# Patient Record
Sex: FEMALE | Race: ASIAN | NOT HISPANIC OR LATINO | Employment: STUDENT | URBAN - METROPOLITAN AREA
[De-identification: names, ages, dates, MRNs, and addresses within clinical notes are randomized per-mention and may not be internally consistent; named-entity substitution may affect disease eponyms.]

---

## 2021-11-08 ENCOUNTER — HOSPITAL ENCOUNTER (EMERGENCY)
Facility: CLINIC | Age: 26
Discharge: HOME | End: 2021-11-08
Attending: EMERGENCY MEDICINE | Admitting: EMERGENCY MEDICINE
Payer: COMMERCIAL

## 2021-11-08 VITALS
BODY MASS INDEX: 40.57 KG/M2 | RESPIRATION RATE: 18 BRPM | WEIGHT: 229 LBS | SYSTOLIC BLOOD PRESSURE: 133 MMHG | OXYGEN SATURATION: 99 % | TEMPERATURE: 98.1 F | HEART RATE: 81 BPM | DIASTOLIC BLOOD PRESSURE: 70 MMHG

## 2021-11-08 DIAGNOSIS — F12.90 MARIJUANA USE: ICD-10-CM

## 2021-11-08 PROCEDURE — 99282 EMERGENCY DEPT VISIT SF MDM: CPT

## 2021-11-08 ASSESSMENT — ENCOUNTER SYMPTOMS: TREMORS: 1

## 2021-11-08 NOTE — ED PROVIDER NOTES
EMERGENCY DEPARTMENT ENCOUNTER      NAME: Tiffanie Matos  AGE: 26 year old female  YOB: 1995  MRN: [id number]  EVALUATION DATE & TIME: 11/8/2021  2:26 AM    PCP: Jennifer Castro    ED PROVIDER: Omaira Lozano M.D.      Chief Complaint   Patient presents with     Panic Attack         FINAL IMPRESSION:  1. Marijuana use        MEDICAL DECISION MAKING:  Pertinent Labs & Imaging studies reviewed. (See chart for details)  ED Course as of 11/08/21 0506   Mon Nov 08, 2021   0320 Patient is also vital signs here with some mild tachypnea, hypertension the patient with significant anxiety at that point.  Repeat vital signs here now within normal limits.  Patient states that she is experiencing symptoms unusual after ingesting marijuana gummy.  She states she is done this in the past, she states that she is worried that she may have overdosed on marijuana.  She states she now feels as if she is coming down when she is in the room.  Denies any thoughts of self-harm.  There is initially some concern in triage that she had some suicidal ideation however when questioned about this multiple times she is not having any thoughts of self-harm, no suicidal ideation at this time.  Occasionally feels depressed but is not suicidal or homicidal.  Denies any concomitant drug use.Physical exam for patient here lying in bed without acute cardiopulmonary distress.  Normal physical exam.We will just monitor here in the emergency department for small amount of time.  No need for urgent imaging at this time.  Family is here at bedside.       Watch patient for 2 hours here, she now states he feels better.  Again not endorsing any thoughts of self-harm.  Her mother is here at bedside.  Will discharge home at this time.    Critical care: 0 minutes excluding separately billable procedures.  Includes bedside management, time reviewing test results, review of records, discussing the case with staff, documenting the medical record and time  "spent with family members (or surrogate decision makers) discussing specific treatment issues.      ED Course:  2:57 AM I met with the patient, obtained history, performed an initial exam, and discussed options and plan for diagnostics and treatment here in the ED.     The importance of close follow up was discussed. We reviewed warning signs and symptoms, and I instructed Ms. Matos to return to the emergency department immediately if she develops any new or worsening symptoms. I provided additional verbal discharge instructions. Ms. Matos expressed understanding and agreement with this plan of care, her questions were answered, and she was discharged in stable condition.     MEDICATIONS GIVEN IN THE EMERGENCY:  Medications - No data to display    NEW PRESCRIPTIONS STARTED AT TODAY'S ER VISIT:  Discharge Medication List as of 11/8/2021  4:23 AM             =================================================================    HPI    Patient information was obtained from: Patient    Use of : N/A         Tiffanie PAULA Matos is a 26 year old female who presents with anxiety after THC use.    The patient reports taking an edible earlier this evening, and she reports \"it is hitting me harder than normal\".  She reports feeling shaky, depersonalized, and anxious.  She has taken an edible in the past. S he denies taking the edible in an attempt to hurt herself.  She denies any suicidal ideation but does report feeling depressed.  No recent falls or trauma.     REVIEW OF SYSTEMS   Review of Systems   Neurological: Positive for tremors.   Psychiatric/Behavioral: Negative for self-injury and suicidal ideas.        Positive for depression  Positive for feeling depersonalized   All other systems reviewed and are negative.    PAST MEDICAL HISTORY:  No past medical history on file.    PAST SURGICAL HISTORY:  No past surgical history on file.    CURRENT MEDICATIONS:    No current facility-administered medications for this " encounter.    Current Outpatient Medications:      ketorolac (TORADOL) 10 mg tablet, [KETOROLAC (TORADOL) 10 MG TABLET] Take 1 tablet (10 mg total) by mouth every 6 (six) hours as needed for pain., Disp: 20 tablet, Rfl: 0    ALLERGIES:  No Known Allergies    FAMILY HISTORY:  No family history on file.    SOCIAL HISTORY:   Social History     Socioeconomic History     Marital status: Single     Spouse name: Not on file     Number of children: Not on file     Years of education: Not on file     Highest education level: Not on file   Occupational History     Not on file   Tobacco Use     Smoking status: Not on file     Smokeless tobacco: Not on file   Substance and Sexual Activity     Alcohol use: Not on file     Drug use: Not on file     Sexual activity: Not on file   Other Topics Concern     Not on file   Social History Narrative     Not on file     Social Determinants of Health     Financial Resource Strain: Not on file   Food Insecurity: Not on file   Transportation Needs: Not on file   Physical Activity: Not on file   Stress: Not on file   Social Connections: Not on file   Intimate Partner Violence: Not on file   Housing Stability: Not on file       PHYSICAL EXAM:    Vitals: /70   Pulse 81   Temp 98.1  F (36.7  C) (Oral)   Resp 18   Wt 103.9 kg (229 lb)   LMP 11/07/2021   SpO2 99%   BMI 40.57 kg/m     General:. Alert and interactive, comfortable appearing.  HENT: Oropharynx without erythema or exudates. MMM.  TMs clear bilaterally.  Eyes: Pupils mid-sized and equally reactive.   Neck: Full AROM.  No midline tenderness to palpation.  Cardiovascular: Regular rate and rhythm. Peripheral pulses 2+ bilaterally.  Chest/Pulmonary: Normal work of breathing. Lung sounds clear and equal throughout, no wheezes or crackles. No chest wall tenderness or deformities.  Abdomen: Soft, nondistended. Nontender without guarding or rebound.  Back/Spine: No CVA or midline tenderness.  Extremities: Normal ROM of all major  joints. No lower extremity edema.   Skin: Warm and dry. Normal skin color.   Neuro: Speech clear. CNs grossly intact. Moves all extremities appropriately. Strength and sensation grossly intact to all extremities.   Psych: Normal affect/mood, cooperative, memory appropriate.     LAB:  All pertinent labs reviewed and interpreted.  Labs Ordered and Resulted from Time of ED Arrival to Time of ED Departure - No data to display    RADIOLOGY:  No orders to display       EKG: See MDM    I, Con Strong, am serving as a scribe to document services personally performed by Dr. Omaira Lozano  based on my observation and the provider's statements to me. I, Omaira Lozano MD attest that Con Strong is acting in a scribe capacity, has observed my performance of the services and has documented them in accordance with my direction.      Omaira Lozano M.D.  Emergency Medicine  [facility] EMERGENCY ROOM  [address] [facility] [id number]  [phone]  Dept: [phone]      Ariane Lozano MD  11/08/21 1995

## 2021-11-08 NOTE — ED TRIAGE NOTES
"Pt here with anxiety.  Pt states injested an edible marijuana gummy.  States injested for recreational purposes.  States having thoughts of suicide. Denies plan, just has \"ideation\" denies prior therapy, outpatient, nor inpatient mental health care.    "